# Patient Record
Sex: FEMALE | Race: WHITE
[De-identification: names, ages, dates, MRNs, and addresses within clinical notes are randomized per-mention and may not be internally consistent; named-entity substitution may affect disease eponyms.]

---

## 2020-01-15 ENCOUNTER — HOSPITAL ENCOUNTER (INPATIENT)
Dept: HOSPITAL 54 - ER | Age: 70
LOS: 2 days | Discharge: HOME | DRG: 190 | End: 2020-01-17
Attending: NURSE PRACTITIONER | Admitting: NURSE PRACTITIONER
Payer: MEDICARE

## 2020-01-15 VITALS — BODY MASS INDEX: 41.46 KG/M2 | WEIGHT: 234 LBS | HEIGHT: 63 IN

## 2020-01-15 DIAGNOSIS — R73.03: ICD-10-CM

## 2020-01-15 DIAGNOSIS — E66.9: ICD-10-CM

## 2020-01-15 DIAGNOSIS — R07.89: ICD-10-CM

## 2020-01-15 DIAGNOSIS — D72.829: ICD-10-CM

## 2020-01-15 DIAGNOSIS — F32.9: ICD-10-CM

## 2020-01-15 DIAGNOSIS — I11.0: ICD-10-CM
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
## 2020-01-15 DIAGNOSIS — T38.0X5A: ICD-10-CM

## 2020-01-15 DIAGNOSIS — F41.9: ICD-10-CM

## 2020-01-15 DIAGNOSIS — E78.5: ICD-10-CM

## 2020-01-15 DIAGNOSIS — J44.1: Primary | ICD-10-CM

## 2020-01-15 DIAGNOSIS — F17.210: ICD-10-CM

## 2020-01-15 DIAGNOSIS — I50.33: ICD-10-CM

## 2020-01-15 LAB
BASOPHILS # BLD AUTO: 0.1 /CMM (ref 0–0.2)
BASOPHILS NFR BLD AUTO: 0.9 % (ref 0–2)
BUN SERPL-MCNC: 14 MG/DL (ref 7–18)
CALCIUM SERPL-MCNC: 9.2 MG/DL (ref 8.5–10.1)
CHLORIDE SERPL-SCNC: 105 MMOL/L (ref 98–107)
CO2 SERPL-SCNC: 32 MMOL/L (ref 21–32)
CREAT SERPL-MCNC: 0.8 MG/DL (ref 0.6–1.3)
EOSINOPHIL NFR BLD AUTO: 1.4 % (ref 0–6)
GLUCOSE SERPL-MCNC: 123 MG/DL (ref 74–106)
HCT VFR BLD AUTO: 40 % (ref 33–45)
HGB BLD-MCNC: 13.8 G/DL (ref 11.5–14.8)
LYMPHOCYTES NFR BLD AUTO: 2.6 /CMM (ref 0.8–4.8)
LYMPHOCYTES NFR BLD AUTO: 34.1 % (ref 20–44)
MCHC RBC AUTO-ENTMCNC: 34 G/DL (ref 31–36)
MCV RBC AUTO: 95 FL (ref 82–100)
MONOCYTES NFR BLD AUTO: 0.5 /CMM (ref 0.1–1.3)
MONOCYTES NFR BLD AUTO: 6.6 % (ref 2–12)
NEUTROPHILS # BLD AUTO: 4.3 /CMM (ref 1.8–8.9)
NEUTROPHILS NFR BLD AUTO: 57 % (ref 43–81)
NT-PROBNP SERPL-MCNC: 76 PG/ML (ref 0–125)
PLATELET # BLD AUTO: 188 /CMM (ref 150–450)
POTASSIUM SERPL-SCNC: 3.8 MMOL/L (ref 3.5–5.1)
RBC # BLD AUTO: 4.25 MIL/UL (ref 4–5.2)
SODIUM SERPL-SCNC: 143 MMOL/L (ref 136–145)
WBC NRBC COR # BLD AUTO: 7.6 K/UL (ref 4.3–11)

## 2020-01-15 PROCEDURE — G0378 HOSPITAL OBSERVATION PER HR: HCPCS

## 2020-01-15 NOTE — NUR
TO ER BED 16 BIB EMS WITH DAUGHTER AT BEDSIDE C/O SOB, CP WHEN COUGHING X 1 
WEEK DEMINISHED BREATH SOUND BILATERALLY ON AUSCULTATION. PT AAOX4 NO ACUTE 
DISTRESS NOTED, RESP EVEN AND UNLABORED. PLACE PT ON CARDIAC MONITORING, 
CONTINUOUS POX, O2@2L/NC. PENDING ER MD SHERIFF.

## 2020-01-16 VITALS — DIASTOLIC BLOOD PRESSURE: 65 MMHG | SYSTOLIC BLOOD PRESSURE: 126 MMHG

## 2020-01-16 VITALS — DIASTOLIC BLOOD PRESSURE: 60 MMHG | SYSTOLIC BLOOD PRESSURE: 104 MMHG

## 2020-01-16 VITALS — SYSTOLIC BLOOD PRESSURE: 112 MMHG | DIASTOLIC BLOOD PRESSURE: 62 MMHG

## 2020-01-16 VITALS — DIASTOLIC BLOOD PRESSURE: 56 MMHG | SYSTOLIC BLOOD PRESSURE: 105 MMHG

## 2020-01-16 VITALS — DIASTOLIC BLOOD PRESSURE: 58 MMHG | SYSTOLIC BLOOD PRESSURE: 122 MMHG

## 2020-01-16 VITALS — DIASTOLIC BLOOD PRESSURE: 73 MMHG | SYSTOLIC BLOOD PRESSURE: 140 MMHG

## 2020-01-16 LAB
BASOPHILS # BLD AUTO: 0 /CMM (ref 0–0.2)
BASOPHILS NFR BLD AUTO: 0.5 % (ref 0–2)
BUN SERPL-MCNC: 13 MG/DL (ref 7–18)
CALCIUM SERPL-MCNC: 8.6 MG/DL (ref 8.5–10.1)
CHLORIDE SERPL-SCNC: 104 MMOL/L (ref 98–107)
CHOLEST SERPL-MCNC: 233 MG/DL (ref ?–200)
CO2 SERPL-SCNC: 31 MMOL/L (ref 21–32)
CREAT SERPL-MCNC: 0.8 MG/DL (ref 0.6–1.3)
EOSINOPHIL NFR BLD AUTO: 0.9 % (ref 0–6)
GLUCOSE SERPL-MCNC: 160 MG/DL (ref 74–106)
HCT VFR BLD AUTO: 39 % (ref 33–45)
HDLC SERPL-MCNC: 38 MG/DL (ref 40–60)
HGB BLD-MCNC: 13.2 G/DL (ref 11.5–14.8)
LDLC SERPL DIRECT ASSAY-MCNC: 163 MG/DL (ref 0–99)
LYMPHOCYTES NFR BLD AUTO: 1.1 /CMM (ref 0.8–4.8)
LYMPHOCYTES NFR BLD AUTO: 18.2 % (ref 20–44)
MAGNESIUM SERPL-MCNC: 1.8 MG/DL (ref 1.8–2.4)
MCHC RBC AUTO-ENTMCNC: 34 G/DL (ref 31–36)
MCV RBC AUTO: 95 FL (ref 82–100)
MONOCYTES NFR BLD AUTO: 0.3 /CMM (ref 0.1–1.3)
MONOCYTES NFR BLD AUTO: 4.6 % (ref 2–12)
NEUTROPHILS # BLD AUTO: 4.7 /CMM (ref 1.8–8.9)
NEUTROPHILS NFR BLD AUTO: 75.8 % (ref 43–81)
PHOSPHATE SERPL-MCNC: 3 MG/DL (ref 2.5–4.9)
PLATELET # BLD AUTO: 174 /CMM (ref 150–450)
POTASSIUM SERPL-SCNC: 3.2 MMOL/L (ref 3.5–5.1)
RBC # BLD AUTO: 4.09 MIL/UL (ref 4–5.2)
SODIUM SERPL-SCNC: 143 MMOL/L (ref 136–145)
TRIGL SERPL-MCNC: 228 MG/DL (ref 30–150)
TSH SERPL DL<=0.005 MIU/L-ACNC: 1.8 UIU/ML (ref 0.36–3.74)
WBC NRBC COR # BLD AUTO: 6.2 K/UL (ref 4.3–11)

## 2020-01-16 RX ADMIN — Medication SCH MG: at 08:53

## 2020-01-16 RX ADMIN — Medication PRN MG: at 10:20

## 2020-01-16 RX ADMIN — Medication PRN MG: at 10:25

## 2020-01-16 RX ADMIN — CARVEDILOL SCH MG: 12.5 TABLET, FILM COATED ORAL at 08:53

## 2020-01-16 RX ADMIN — Medication PRN MG: at 10:55

## 2020-01-16 RX ADMIN — CARVEDILOL SCH MG: 12.5 TABLET, FILM COATED ORAL at 21:37

## 2020-01-16 RX ADMIN — Medication SCH MG: at 20:18

## 2020-01-16 RX ADMIN — ALBUTEROL SULFATE SCH MG: 2.5 SOLUTION RESPIRATORY (INHALATION) at 16:31

## 2020-01-16 RX ADMIN — Medication PRN MG: at 10:10

## 2020-01-16 RX ADMIN — Medication PRN MG: at 10:50

## 2020-01-16 RX ADMIN — Medication PRN MG: at 10:35

## 2020-01-16 RX ADMIN — Medication SCH MG: at 11:46

## 2020-01-16 RX ADMIN — Medication PRN MG: at 10:45

## 2020-01-16 RX ADMIN — ALBUTEROL SULFATE SCH MG: 2.5 SOLUTION RESPIRATORY (INHALATION) at 08:10

## 2020-01-16 RX ADMIN — Medication SCH MG: at 08:10

## 2020-01-16 RX ADMIN — ALBUTEROL SULFATE SCH MG: 2.5 SOLUTION RESPIRATORY (INHALATION) at 20:18

## 2020-01-16 RX ADMIN — FUROSEMIDE SCH MG: 80 TABLET ORAL at 12:32

## 2020-01-16 RX ADMIN — Medication PRN MG: at 10:30

## 2020-01-16 RX ADMIN — Medication PRN EACH: at 12:42

## 2020-01-16 RX ADMIN — Medication SCH MG: at 16:31

## 2020-01-16 RX ADMIN — Medication PRN MG: at 10:40

## 2020-01-16 RX ADMIN — ALBUTEROL SULFATE SCH MG: 2.5 SOLUTION RESPIRATORY (INHALATION) at 11:46

## 2020-01-16 RX ADMIN — Medication PRN MG: at 10:15

## 2020-01-16 NOTE — NUR
TELE LVN CLOSING NOTES

 PT BACK TO REST AFTER MORNING CARE DONE,. STABLE CAMPOS THE NIGHT AND SLEPT WELL. TELE SINUS 
RHYTHM PER MONITOR. DENIES ANY PAIN OR ANY DISCOMFORT KEPT HER WARM AND COMFORTABLE AT ALL 
TIMES. WILL ENDORSE TO AM NURSE FOR CONTINUITY OF CARE. PLACE CALL LIGHT AT REACH.

## 2020-01-16 NOTE — NUR
tele RN initial notes

Received pt is ambulatory with assist, no signs of any discomfort noted. breathing even and 
non-labored, no SOB noted as well. Denies chest pain.oriented where she at and how to used 
the call light system. kept her warm and comfortable at all times. tele SR heart rate 81 per 
monitor. place call light at reach. will continue monitoring.

## 2020-01-16 NOTE — NUR
Spoke with patient> alert and oriented, she lives locally alone. She is ambulatory and 
independent with adl's. Has no DME or homehealth reported. Pcp is Dr. Simons in [place]. She plan to return home once discharge. 

-------------------------------------------------------------------------------

Addendum: 01/16/20 at 2229 by SUDHEER SIMMS RN

-------------------------------------------------------------------------------

Amended: Links added.

## 2020-01-17 VITALS — DIASTOLIC BLOOD PRESSURE: 69 MMHG | SYSTOLIC BLOOD PRESSURE: 129 MMHG

## 2020-01-17 VITALS — DIASTOLIC BLOOD PRESSURE: 62 MMHG | SYSTOLIC BLOOD PRESSURE: 118 MMHG

## 2020-01-17 VITALS — DIASTOLIC BLOOD PRESSURE: 54 MMHG | SYSTOLIC BLOOD PRESSURE: 105 MMHG

## 2020-01-17 LAB
BASOPHILS # BLD AUTO: 0 /CMM (ref 0–0.2)
BASOPHILS NFR BLD AUTO: 0.2 % (ref 0–2)
BUN SERPL-MCNC: 20 MG/DL (ref 7–18)
CALCIUM SERPL-MCNC: 9.5 MG/DL (ref 8.5–10.1)
CHLORIDE SERPL-SCNC: 104 MMOL/L (ref 98–107)
CO2 SERPL-SCNC: 30 MMOL/L (ref 21–32)
CREAT SERPL-MCNC: 0.8 MG/DL (ref 0.6–1.3)
EOSINOPHIL NFR BLD AUTO: 0 % (ref 0–6)
GLUCOSE SERPL-MCNC: 162 MG/DL (ref 74–106)
HCT VFR BLD AUTO: 40 % (ref 33–45)
HGB BLD-MCNC: 13.7 G/DL (ref 11.5–14.8)
LYMPHOCYTES NFR BLD AUTO: 0.9 /CMM (ref 0.8–4.8)
LYMPHOCYTES NFR BLD AUTO: 7.7 % (ref 20–44)
MAGNESIUM SERPL-MCNC: 1.8 MG/DL (ref 1.8–2.4)
MCHC RBC AUTO-ENTMCNC: 34 G/DL (ref 31–36)
MCV RBC AUTO: 95 FL (ref 82–100)
MONOCYTES NFR BLD AUTO: 0.3 /CMM (ref 0.1–1.3)
MONOCYTES NFR BLD AUTO: 2.2 % (ref 2–12)
NEUTROPHILS # BLD AUTO: 11 /CMM (ref 1.8–8.9)
NEUTROPHILS NFR BLD AUTO: 89.9 % (ref 43–81)
PHOSPHATE SERPL-MCNC: 3.7 MG/DL (ref 2.5–4.9)
PLATELET # BLD AUTO: 180 /CMM (ref 150–450)
POTASSIUM SERPL-SCNC: 3.8 MMOL/L (ref 3.5–5.1)
RBC # BLD AUTO: 4.27 MIL/UL (ref 4–5.2)
SODIUM SERPL-SCNC: 144 MMOL/L (ref 136–145)
WBC NRBC COR # BLD AUTO: 12.3 K/UL (ref 4.3–11)

## 2020-01-17 RX ADMIN — FUROSEMIDE SCH MG: 80 TABLET ORAL at 09:19

## 2020-01-17 RX ADMIN — Medication SCH MG: at 11:49

## 2020-01-17 RX ADMIN — Medication PRN EACH: at 09:20

## 2020-01-17 RX ADMIN — ALBUTEROL SULFATE SCH MG: 2.5 SOLUTION RESPIRATORY (INHALATION) at 15:57

## 2020-01-17 RX ADMIN — Medication SCH MG: at 15:57

## 2020-01-17 RX ADMIN — Medication SCH MG: at 08:21

## 2020-01-17 RX ADMIN — ALBUTEROL SULFATE SCH MG: 2.5 SOLUTION RESPIRATORY (INHALATION) at 08:21

## 2020-01-17 RX ADMIN — ALBUTEROL SULFATE SCH MG: 2.5 SOLUTION RESPIRATORY (INHALATION) at 11:49

## 2020-01-17 RX ADMIN — Medication SCH MG: at 09:20

## 2020-01-17 RX ADMIN — CARVEDILOL SCH MG: 12.5 TABLET, FILM COATED ORAL at 09:34

## 2020-01-17 NOTE — NUR
TELE RN AM NOTES



ALERT,AWAKE & VERBALLY RESPONSIVE IN [place] AND LITTLE ENGLISH. DENIES 
DISTRESS/DISCOMFORT. NO SOB NOTED. ON TELE SR @ 68 WITH IV-HL PATENT & INTACT. MONITORED 
ACCORDINGLY. CALL LIGHT WITHIN REACH. BED IN LOWEST POSITION. SR UP X 2 FOR SAFETY.

## 2020-01-17 NOTE — NUR
DISCHARGED PT HOME WITH STABLE V/S ACCOMPANIED BY HER SON IN [place].IV H/L REMOVED TO RT AC AND 
LFA WITH NO BLEEDING NOTED.PT TOLERATED WELL.DISCHARGE INSTRUCTIONS AND PRESCRIPTIONS GIVEN 
TO THE PT AND CALLED IN TO PT'S PHARMACY AND CONFIRMED READY TO BE DELIVERED TO PT'S HOME.

## 2020-01-17 NOTE — NUR
TELE RN NOTES



AWAKE & RESPONSIVE. NOT IN ANY DISTRESS. NO SOB NOTED. ON TELE SR @ 68 WITH IV-HL PATENT & 
INTACT. MONITORED ACCORDINGLY. CALL LIGHT WITHIN REACH. BED IN LOWEST POSITION. SR UP X 2 
FOR SAFETY. WILL ENDORSE TO NEXT SHIFT.